# Patient Record
Sex: MALE | Race: WHITE | Employment: FULL TIME | URBAN - METROPOLITAN AREA
[De-identification: names, ages, dates, MRNs, and addresses within clinical notes are randomized per-mention and may not be internally consistent; named-entity substitution may affect disease eponyms.]

---

## 2020-07-14 ENCOUNTER — HOSPITAL ENCOUNTER (OUTPATIENT)
Dept: MRI IMAGING | Age: 50
Discharge: HOME | End: 2020-07-14
Payer: COMMERCIAL

## 2020-07-14 PROCEDURE — 73221 MRI JOINT UPR EXTREM W/O DYE: CPT

## 2020-08-03 ENCOUNTER — TELEPHONE (OUTPATIENT)
Dept: ORTHOPEDIC SURGERY | Age: 50
End: 2020-08-03

## 2020-08-03 ENCOUNTER — OFFICE VISIT (OUTPATIENT)
Dept: ORTHOPEDIC SURGERY | Age: 50
End: 2020-08-03
Payer: COMMERCIAL

## 2020-08-03 VITALS — HEIGHT: 71 IN | BODY MASS INDEX: 30.8 KG/M2 | TEMPERATURE: 98.6 F | WEIGHT: 220 LBS

## 2020-08-03 PROCEDURE — 99204 OFFICE O/P NEW MOD 45 MIN: CPT | Performed by: ORTHOPAEDIC SURGERY

## 2020-08-03 RX ORDER — HYDROCODONE BITARTRATE AND ACETAMINOPHEN 5; 325 MG/1; MG/1
TABLET ORAL
COMMUNITY
Start: 2020-07-27

## 2020-08-03 RX ORDER — DICLOFENAC SODIUM 75 MG/1
75 TABLET, DELAYED RELEASE ORAL 2 TIMES DAILY
Qty: 60 TABLET | Refills: 1 | Status: SHIPPED | OUTPATIENT
Start: 2020-08-03

## 2020-08-04 NOTE — TELEPHONE ENCOUNTER
Patient wanted note at time of visit - told patient once note completeed we would mail to patient --office note mailed

## 2020-08-05 ENCOUNTER — TELEPHONE (OUTPATIENT)
Dept: ORTHOPEDIC SURGERY | Age: 50
End: 2020-08-05

## 2025-03-31 NOTE — PROGRESS NOTES
12 [facility]  Office Visit  Workers Comp  Date:  8/3/2020    Name:  Josefina Spencer  Address:  [facility] [address]  [facility] [id number]    :  1970      Age:   53 y.o.    SSN:  [id number]      Medical Record Number:  [id number]    Chief Complaint:    Right shoulder pain    HPI:   Josefina Spencer is a 53 y.o. male who presents today with a chief complaint of right shoulder pain. 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 He locates the pain to the lateral aspect of the shoulder with some radiation down the lateral aspect of the arm as well as pain trapezius muscle. He denies radiation of the pain into the fingers. States the pain is somewhat prominent with sleep however not worse. Pain is worse with work with lifting, pushing, pulling. Denies any neck pain or previous neck evaluation. He has been taking Norco as well as over-the-counter ibuprofen for pain relief. He has been on light duty at work. He denies any new numbness, tingling, fevers, chills, chest pain, shortness of breath, or any other new significant symptoms. Pain Assessment  Location of Pain: Shoulder  Location Modifiers: Right  Severity of Pain: 4  Quality of Pain: Sharp, Aching  Duration of Pain: Persistent  Frequency of Pain: Constant  Date Pain First Started: 21  Aggravating Factors: Bending, Straightening, Stretching  Limiting Behavior: Yes  Relieving Factors: Rest  Result of Injury: Yes  Work-Related Injury: Yes  Are there other pain locations you wish to document?: No    Past History:  History reviewed. No pertinent past medical history. History reviewed. No pertinent surgical history.     Social History     Tobacco Use    Smoking status: Never Smoker    Smokeless tobacco: Never Used   Substance Use Topics    Refer to play therapy  Parent-Child Interaction Therapy (PCIT)  Common things PCIT can help with:  Strengthening parent-child relationship  Learning how to manage more challenging/difficult behaviors  Supporting parents in learning different parenting strategies to help increase youth's language skills, focus, and behavior through positive reinforcement  What is PCIT?  A well-researched parent-training intervention that helps youth and their families.  Lasts about 16 weeks.  Counseling that is done virtually, rarely within the home setting, or at a clinic location.  Includes parents and children in session together.  Who Benefits From PCIT?  Youth ages 2-7, and their families, who are having a hard time with disruptive/externalizing behaviors, oppositional defiant disorder (ODD), Attention-Deficit/Hyperactivity Disorder (ADHD), or symptoms of anxiety that present with externalizing behaviors (I.e., opposition, defiance, or aggression).  Families that are struggling to manage their children's challenging and/or difficult behaviors  PCIT is for:  Natural parents  Foster parents  Kinship caregivers  Single and two-parent families  Guardians  PCIT consists of 2 parts  Child-Directed Interaction: This first phase of PCIT aims to restructure the parent-child relationship and foster a warm and secure connection between the parent and child. Parents learn to selectively attend to good behavior and reinforce pro-social interactions using play therapy skills.  Parent-Directed Interaction: The second phase of PCIT directly addresses behavior problems by establishing consistent expectations for child behavior and introducing effective disciplinary techniques.  Both phases of PCIT involve live coaching in which parents are coached by the therapist through an earpiece while the therapist observes their interactions. If in an office setting, this is done through a one-way mirror.    More information and resources about  Alcohol use: Not on file    Drug use: Not on file        Family History:  family history is not on file. Current Outpatient Medications:     diclofenac (VOLTAREN) 75 MG EC tablet, Take 1 tablet by mouth 2 times daily, Disp: 60 tablet, Rfl: 1    HYDROcodone-acetaminophen (NORCO) 5-325 MG per tablet, TK 1 T PO  BID PRN., Disp: , Rfl:       Allergies   Allergen Reactions    Morphine Shortness Of Breath         Review of Systems: 10 point view of systems was completed and is negative unless otherwise stated in the HPI      Physical Exam:  Temp 98.6 °F (37 °C)   Ht 5' 11\" (1.803 m)   Wt 220 lb (99.8 kg)   BMI 30.68 kg/m²       General: No acute distress, well nourished  CV: No obvious peripheral edema. Normal peripheral pulses  Neuro: Alert & oriented x 3  Psych: Normal mood and affect    Right shoulder exam    Skin:  No skin rashes or lesions, warm, well perfused  Inspection: No deformity,  Palpation: Tender to palpation anteriorly and laterally at the shoulder  Stability: No instability  Sensation: Intact in all distributions  Motor: AIN/PIN/U 5/5  Strong radial pulse     FF Abd ER IR   Active ROM  140  120  40  T7   Passive ROM  140  120  40    Strength (x/5)  4+/5  + Jobes    5/5  5/5     Positive Honeycutt  Negative Jurgenson's  Negative speeds      Radiographic:  X-rays obtained and reviewed in office, reviewed and interpreted by me today:   Views: 3  Location: Right shoulder  Impression: Demonstrating mild to moderate right AC joint arthritis with joint space narrowing, no overt arthritis of the glenohumeral joint and the glenohumeral articulation remains well aligned.   No obvious fracture or dislocation noted    MRI of the right shoulder previously obtained was also reviewed this demonstrates a partial-thickness supraspinatus/infraspinatus tear    Assessment:  Christel Velarde is a 53 y.o. male with:  1: Right AC joint arthritis  2: Right rotator cuff tendinitis    Impression:  Encounter Diagnoses PCIT:  Parent-Child Interaction Therapy Website: http://www.pcit.org/for-parents.html       PCIT Therapists [updated 10/28/24]  [facility] Waitlist/Notes Insurances    Meggan Neville, PhD  Meggan Armas, PhD  Latanya Campbell, DAQUANW  [facility]  Department of Psychiatry & [place] [address]  Phone: [phone]  [email or web address]-doctors/behavioral-health 1-2 months All insurances including Medicaid   [facility]  [address] [place] [address]  Phone: [phone]  Phone: [phone]  Or  [phone] Small waitlist (Under 6 years) All insurances    Loly Coe, Ph.D, LPC-S, NCC, [facility]  [address]  [place] [address]  Phone: [phone]  Email: [email or web address]  [email or web address] No waitlist Dr. Coe does not accept insurance, though contact clinic to verify   Elisa Durand, PhD  [facility] ([facility])  [address] [place] [address]  Phone: [phone]  [email or web address]    Accepts most insurances   Karen Medina, Ph.D.  [address], [place] [address]  Phone:  [phone]  Email:  [email or web address]  Website: [email or web address]/  Blue Cross Blue Shield, PPO  No insurance:  $250 for initial   $150 for 60 min sessions     [facility] Waitlist/Notes Insurances    Lauren Esposito, DAQUANW  Cami Ford, TAMMY, RPT, NCC  [facility]  [address] [place] [address]  Phone: [phone]  Email: [email or web address]  Website: [email or web address]/ Less than a month  (will work with children with ASD) Blue Cross Blue Shield, PPO  Aetna  United  Healthcare Commercial   Optum   Soco Ruiz, PhD, LPC-S  Jany Dowell LPC, RPT  [facility]  [address] [address]  Phone: [phone]  Email: [email or web address]   Request appt:  [email or web address]  Out-of-network for all insurance plans, though possible services may be covered - call your insurance     Rylee Grimaldo, [facility]  [address] [place] [address], [address]  [place] [address]  Phone: [phone]  Email: [email or web address]  Website: https://[employer]/ Less than a month  (will work with children with ASD) Out-of-network for all insurance plans, though possible services may be covered - call your insurance   LUIS Leon, [facility]  [address] [facility] [place] [address]  Phone: [phone]  Email: [email or web address]  Website: [email or web address]  Accepts most insurance plans  Slide scale (when necessary)     [facility]  Location Waitlist/Notes Insurances    Elisha Cronin LPC, [facility]  [facility]  [address] [place] [address]  Phone: [phone]  Email: [email or web address]  Website: [email or web address]    Medicaid  Most major private insurance including:  [facility]  Aetna   In DAQUAN HayesW, BACS  STS  [address] [place] [address]  Email: [email or web address]    Email to inquire about [facility]  Location Waitlist/Notes Insurances    Vanessa Smith, PhD  Ally Rivera, MS, LPC-S, CCC-SLP  [facility]  [address] [place] [address]  Phone: [phone]  Email: [email or web address]  Website: [email or web address]   4 months, but accepting evaluations [facility]  CONCETTA Wyman,  Name Primary?  Rotator cuff tendonitis, right Yes    Arthritis of right acromioclavicular joint        Office Procedures:  Orders Placed This Encounter   Procedures    XR SHOULDER RIGHT (MIN 2 VIEWS)     Standing Status:   Future     Number of Occurrences:   1     Standing Expiration Date:   8/3/2021     Order Specific Question:   Reason for exam:     Answer:   Pain    [facility]SR PT - [facility] Physical Therapy     Referral Priority:   Routine     Referral Type:   Eval and Treat     Referral Reason:   Specialty Services Required     Requested Specialty:   Physical Therapy     Number of Visits Requested:   1       Plan:   X-rays were reviewed with the patient today. We did discuss given his imaging and history and clinical exam that we feel his pain is coming from his right [facility] joint arthritis as well as rotator cuff tendinitis. We did recommend a course of physical therapy as well as prescription anti-inflammatories. Diclofenac 75 mg twice daily was sent to his pharmacy. We would also recommend injections into the right subacromial space as well into the right AC joint. We will have the patient follow-up once these injections are approved. We will have him schedule his therapy appointment today. Patient did inquire about returning to work we discussed that we do not feel he would do any further permanent damage but that he would be in pain during work. [facility]     08/03/20  9:10 AM    The encounter with Ltaasha Conway was supervised by Dr Jovon Manzano who personally examined the patient and reviewed the plan. This dictation was performed with a verbal recognition program (DRAGON) and it was checked for errors. It is possible that there are still dictated errors within this office note. If so, please bring any errors to my attention for an addendum. All efforts were made to ensure that this office note is accurate. Attestation:  I was physically present and performed my own examination of this patient and have discussed the case, including pertinent history and exam findings with the fellow. I agree with the documented assessment and plan. Annabel Ahuja.  Osmel Soto MD \[facility] [place] [facility]\""W-BACS  [facility] Information, Education &   Counseling Center  [address] [place] [address]  Phone:  [phone]  Email: [email or web address]  All Medicaid except Aetna     [facility] [place]  Location Waitlist/Notes Insurances    Judith Payne LPC  [facility]  [address] [place] [address]  Phone: [phone]  Website: [email or web address]/child-[email or web address]-[email or web address]   No waitlist Medicaid  Most private insurance  Payment plans     [facility]  Location Waitlist/Notes Insurances    Stephanie Muñoz, MS, LPC, LMFT  [facility]  [address]  [place] [address]  Phone: [phone]  Email: [email or web address]  Website: [email or web address]   One month Most private insurances   Medicaid   Sliding scale     [place]  Location Waitlist/Notes Insurances    Montana Wu LCSW  [facility]   [address]  [place] [address]  Phone: [phone] or [phone]   4-6 months (referral needed) All insurances, including Medicaid     Austin Mayfield, [facility]  Elmira Moses MA, MILAGROS, LPC  [facility]  [address] [place] [address]  Phone: [phone]  Email: [email or web address]  Website: [email or web address]  Aetna  [facility]  Cigna  Humana  UMR  Financial assistance if you quality    Deepak Sagastume III, PhD,   [facility]  [address] [place] [address]  Phone: [phone]  Website: [email or web address]-[email or web address]   Primarily works with children ASD or communication challenges Medicaid (referral needs to be faxed first)  Numerous private insurances - call to inquire     [facility] (all of [address] [place])  Location  "Waitlist/Notes Insurances    Elisa Zayas, Ph.D.   [facility]   [address] [address]  Phone: [phone] 3-6 months (referral needed)  (will work with children with ASD) All insurances including Medicaid   BERTHA Purdy  [facility] [employer]  Email: [email or web address]  Phone: [phone]  Website: [email or web address]  Mickey Gregory  United Healthcare Medicaid Humana Blue Cross Blue Shield     Recommendations when calling your insurance to inquire about behavioral health coverage:  Some providers will provide you with a "superbill" which may allow you to submit for reimbursement. Depending on your current health insurance provider plan, full or partial reimbursement may be possible. We recommend contacting your provider and asking these questions:   Does my plan include out of network mental health benefits?   Do I have a deductible, if so, how much have I met?   Does my plan limit the number of counseling sessions in a plan year?   How do I submit a "superbill" for reimbursement?   "